# Patient Record
Sex: MALE | Race: WHITE
[De-identification: names, ages, dates, MRNs, and addresses within clinical notes are randomized per-mention and may not be internally consistent; named-entity substitution may affect disease eponyms.]

---

## 2018-01-03 ENCOUNTER — HOSPITAL ENCOUNTER (OUTPATIENT)
Age: 63
Discharge: HOME | End: 2018-01-03

## 2018-01-03 ENCOUNTER — HOSPITAL ENCOUNTER (OUTPATIENT)
Dept: HOSPITAL 91 - SDS | Age: 63
Discharge: HOME | End: 2018-01-03
Payer: COMMERCIAL

## 2018-01-03 DIAGNOSIS — J44.9: Primary | ICD-10-CM

## 2018-01-03 PROCEDURE — 71045 X-RAY EXAM CHEST 1 VIEW: CPT

## 2018-01-03 PROCEDURE — 31622 DX BRONCHOSCOPE/WASH: CPT

## 2018-01-03 PROCEDURE — 93005 ELECTROCARDIOGRAM TRACING: CPT

## 2018-01-03 PROCEDURE — 87102 FUNGUS ISOLATION CULTURE: CPT

## 2018-01-03 PROCEDURE — 87116 MYCOBACTERIA CULTURE: CPT

## 2018-02-21 ENCOUNTER — HOSPITAL ENCOUNTER (OUTPATIENT)
Age: 63
Discharge: HOME | End: 2018-02-21

## 2018-02-21 ENCOUNTER — HOSPITAL ENCOUNTER (OUTPATIENT)
Dept: HOSPITAL 91 - HPC | Age: 63
Discharge: HOME | End: 2018-02-21
Payer: COMMERCIAL

## 2018-02-21 DIAGNOSIS — K76.0: ICD-10-CM

## 2018-02-21 DIAGNOSIS — N31.9: ICD-10-CM

## 2018-02-21 DIAGNOSIS — Z72.0: ICD-10-CM

## 2018-02-21 DIAGNOSIS — B19.20: ICD-10-CM

## 2018-02-21 DIAGNOSIS — K76.89: Primary | ICD-10-CM

## 2018-02-21 DIAGNOSIS — N13.9: ICD-10-CM

## 2018-02-21 DIAGNOSIS — F10.11: ICD-10-CM

## 2018-02-21 DIAGNOSIS — J44.9: ICD-10-CM

## 2019-04-20 ENCOUNTER — HOSPITAL ENCOUNTER (OUTPATIENT)
Dept: HOSPITAL 10 - SDS | Age: 64
Discharge: HOME | End: 2019-04-20
Attending: UROLOGY
Payer: COMMERCIAL

## 2019-04-20 ENCOUNTER — HOSPITAL ENCOUNTER (OUTPATIENT)
Dept: HOSPITAL 91 - SDS | Age: 64
Discharge: HOME | End: 2019-04-20
Payer: COMMERCIAL

## 2019-04-20 VITALS — SYSTOLIC BLOOD PRESSURE: 136 MMHG | HEART RATE: 56 BPM | RESPIRATION RATE: 12 BRPM | DIASTOLIC BLOOD PRESSURE: 79 MMHG

## 2019-04-20 VITALS — HEART RATE: 62 BPM | SYSTOLIC BLOOD PRESSURE: 127 MMHG | RESPIRATION RATE: 18 BRPM | DIASTOLIC BLOOD PRESSURE: 75 MMHG

## 2019-04-20 VITALS — RESPIRATION RATE: 14 BRPM | SYSTOLIC BLOOD PRESSURE: 138 MMHG | DIASTOLIC BLOOD PRESSURE: 82 MMHG | HEART RATE: 56 BPM

## 2019-04-20 VITALS — RESPIRATION RATE: 18 BRPM | HEART RATE: 68 BPM | SYSTOLIC BLOOD PRESSURE: 142 MMHG | DIASTOLIC BLOOD PRESSURE: 82 MMHG

## 2019-04-20 VITALS — HEART RATE: 58 BPM | SYSTOLIC BLOOD PRESSURE: 130 MMHG | DIASTOLIC BLOOD PRESSURE: 75 MMHG | RESPIRATION RATE: 12 BRPM

## 2019-04-20 VITALS — RESPIRATION RATE: 11 BRPM | DIASTOLIC BLOOD PRESSURE: 80 MMHG | SYSTOLIC BLOOD PRESSURE: 142 MMHG | HEART RATE: 158 BPM

## 2019-04-20 VITALS — SYSTOLIC BLOOD PRESSURE: 132 MMHG | DIASTOLIC BLOOD PRESSURE: 79 MMHG | RESPIRATION RATE: 12 BRPM | HEART RATE: 56 BPM

## 2019-04-20 VITALS — RESPIRATION RATE: 12 BRPM | HEART RATE: 56 BPM | DIASTOLIC BLOOD PRESSURE: 79 MMHG | SYSTOLIC BLOOD PRESSURE: 136 MMHG

## 2019-04-20 VITALS — RESPIRATION RATE: 11 BRPM | DIASTOLIC BLOOD PRESSURE: 74 MMHG | HEART RATE: 62 BPM | SYSTOLIC BLOOD PRESSURE: 116 MMHG

## 2019-04-20 VITALS — HEART RATE: 62 BPM | SYSTOLIC BLOOD PRESSURE: 135 MMHG | DIASTOLIC BLOOD PRESSURE: 80 MMHG | RESPIRATION RATE: 21 BRPM

## 2019-04-20 VITALS — DIASTOLIC BLOOD PRESSURE: 83 MMHG | RESPIRATION RATE: 14 BRPM | HEART RATE: 60 BPM | SYSTOLIC BLOOD PRESSURE: 134 MMHG

## 2019-04-20 VITALS — SYSTOLIC BLOOD PRESSURE: 138 MMHG | RESPIRATION RATE: 22 BRPM | DIASTOLIC BLOOD PRESSURE: 82 MMHG | HEART RATE: 64 BPM

## 2019-04-20 VITALS — RESPIRATION RATE: 18 BRPM | DIASTOLIC BLOOD PRESSURE: 73 MMHG | SYSTOLIC BLOOD PRESSURE: 111 MMHG

## 2019-04-20 VITALS — RESPIRATION RATE: 14 BRPM | DIASTOLIC BLOOD PRESSURE: 82 MMHG | SYSTOLIC BLOOD PRESSURE: 138 MMHG | HEART RATE: 56 BPM

## 2019-04-20 VITALS
WEIGHT: 162.26 LBS | WEIGHT: 162.26 LBS | BODY MASS INDEX: 25.47 KG/M2 | HEIGHT: 67 IN | BODY MASS INDEX: 25.47 KG/M2 | HEIGHT: 67 IN | BODY MASS INDEX: 25.47 KG/M2

## 2019-04-20 VITALS — SYSTOLIC BLOOD PRESSURE: 123 MMHG | DIASTOLIC BLOOD PRESSURE: 87 MMHG | RESPIRATION RATE: 22 BRPM

## 2019-04-20 VITALS — DIASTOLIC BLOOD PRESSURE: 72 MMHG | SYSTOLIC BLOOD PRESSURE: 119 MMHG | RESPIRATION RATE: 23 BRPM | HEART RATE: 122 BPM

## 2019-04-20 VITALS — RESPIRATION RATE: 22 BRPM | DIASTOLIC BLOOD PRESSURE: 82 MMHG | HEART RATE: 64 BPM | SYSTOLIC BLOOD PRESSURE: 138 MMHG

## 2019-04-20 VITALS — HEART RATE: 62 BPM | SYSTOLIC BLOOD PRESSURE: 135 MMHG | RESPIRATION RATE: 21 BRPM | DIASTOLIC BLOOD PRESSURE: 80 MMHG

## 2019-04-20 VITALS — DIASTOLIC BLOOD PRESSURE: 77 MMHG | RESPIRATION RATE: 11 BRPM | HEART RATE: 60 BPM | SYSTOLIC BLOOD PRESSURE: 137 MMHG

## 2019-04-20 VITALS — DIASTOLIC BLOOD PRESSURE: 80 MMHG | SYSTOLIC BLOOD PRESSURE: 139 MMHG | RESPIRATION RATE: 18 BRPM | HEART RATE: 68 BPM

## 2019-04-20 VITALS — SYSTOLIC BLOOD PRESSURE: 126 MMHG | RESPIRATION RATE: 14 BRPM | DIASTOLIC BLOOD PRESSURE: 73 MMHG | HEART RATE: 52 BPM

## 2019-04-20 VITALS — HEART RATE: 76 BPM | RESPIRATION RATE: 13 BRPM | SYSTOLIC BLOOD PRESSURE: 129 MMHG | DIASTOLIC BLOOD PRESSURE: 73 MMHG

## 2019-04-20 VITALS — DIASTOLIC BLOOD PRESSURE: 78 MMHG | SYSTOLIC BLOOD PRESSURE: 132 MMHG | RESPIRATION RATE: 13 BRPM | HEART RATE: 52 BPM

## 2019-04-20 DIAGNOSIS — N43.40: ICD-10-CM

## 2019-04-20 DIAGNOSIS — I12.9: ICD-10-CM

## 2019-04-20 DIAGNOSIS — J44.9: ICD-10-CM

## 2019-04-20 DIAGNOSIS — N43.3: Primary | ICD-10-CM

## 2019-04-20 DIAGNOSIS — N18.9: ICD-10-CM

## 2019-04-20 PROCEDURE — 55041 REMOVAL OF HYDROCELES: CPT

## 2019-04-20 PROCEDURE — 88302 TISSUE EXAM BY PATHOLOGIST: CPT

## 2019-04-20 RX ADMIN — CEFTRIAXONE 1 MLS/HR: 1 INJECTION, SOLUTION INTRAVENOUS at 08:00

## 2019-04-20 RX ADMIN — BUPIVACAINE HYDROCHLORIDE 1 ML: 5 INJECTION, SOLUTION EPIDURAL; INTRACAUDAL; PERINEURAL at 08:53

## 2019-04-20 RX ADMIN — HYDROMORPHONE HYDROCHLORIDE 1 MG: 2 INJECTION INTRAMUSCULAR; INTRAVENOUS; SUBCUTANEOUS at 10:03

## 2019-04-20 RX ADMIN — BACITRACIN ZINC, AND POLYMYXIN B SULFATE 1 APPLIC: 500; 10000 OINTMENT TOPICAL at 06:59

## 2019-04-20 RX ADMIN — HYDROCODONE BITARTRATE AND ACETAMINOPHEN 1 TAB: 5; 325 TABLET ORAL at 11:10

## 2019-04-20 RX ADMIN — HYDROMORPHONE HYDROCHLORIDE 1 MG: 2 INJECTION INTRAMUSCULAR; INTRAVENOUS; SUBCUTANEOUS at 09:48

## 2019-04-20 NOTE — PAC
Date/Time of Note


Date/Time of Note


DATE: 4/20/19 


TIME: 09:15





Post-Anesthesia Notes


Post-Anesthesia Note


Last documented vital signs





Vital Signs


  Date      Temp  Pulse  Resp  B/P (MAP)   Pulse Ox  O2          O2 Flow    FiO2


Time                                                 Delivery    Rate


   4/20/19  98.2    122    23      119/72        97  Room Air


     06:42                           (88)





Activity:  WNL


Respiratory function:  WNL


Cardiovascular function:  WNL


Mental status:  Baseline


Pain reasonably controlled:  Yes


Hydration appropriate:  Yes


Nausea/Vomiting absent:  Yes











BIGG HADDAD MD          Apr 20, 2019 09:15

## 2019-04-20 NOTE — OPR
Date/Time of Note


Date/Time of Note


DATE: 4/20/19 


TIME: 09:20





Operative Report


Procedure Date:  Apr 20, 2019


Preoperative Diagnosis


Bilateral hydroceles


Postoperative Diagnosis


Bilateral hydroceles and spermatoceles


Operation/Procedure Performed


Bilateral hydrocelectomy and excision of bilateral spermatoceles.


Surgeon


see signature line


Assistant


Scrub tech Phyllis


Anesthesia Type:  general


Anesthesiologist:  BIGG HADDAD MD


Estimated Blood Loss:  0 - 10 ml's


Transfusion


   none


Specimen


Hydrocele sacs


Grafts/Implants


none


Complications


none


Pt Condition Post Procedure:  stable


Disposition:  PACU


Indications


Bilateral hydroceles


Procedure Description


The patient was brought to the operating room and given general anesthesia.  The


patient was positioned in the supine position and the lower abdomen genital area


and upper thighs were all prepped and draped in the usual sterile manner.  


Patient was given 1 g of ceftriaxone IV at the start of the procedure.  A 


timeout was done and the patient was identified by his name, birthdate and the 


procedure.  A vertical incision was made over the right side of the scrotum and 


deepened through the different layers of the scrotal wall then the scrotal 


content was delivered through the incision and the patient was found to have 


multiple large spermatoceles in addition to the hydrocele.  The hydrocele was dr


ained and then the spermatoceles were all drained and it had about 6 or 7 


spermatoceles.  All the hydrocele and the spermatocele sacs where excised.  They


wear all drained and their edges and bases were fulgurated.  Good hemostasis was


obtained.  A 10 mm LEONIE drain was inserted through a different stab wound.  The 


wound was then closed using 3-0 Vicryl running interlocked sutures for the 


subcutaneous tissue and 3-0 Vicryl mattress sutures for the skin.  The LEONIE drain 


was secured with a suture of 3-0 Vicryl as well.  Similar procedure was done on 


the left side except no LEONIE drain was inserted at that side.  And the 


spermatoceles and the hydrocele on the left side were smaller.  The patient was 


given half percent Marcaine injection on both incisions for local analgesia.  A 


sterile dressing was applied on both incisions first Telfa and then fluffs these


were held in place with a scrotal support.  Patient was discharged to the 


recovery room in a stable and satisfactory condition.  He will come to the 


office in about 4-5 days to have the LEONIE drain removed.











SOLOMON DOYLE MD            Apr 20, 2019 09:31

## 2019-04-20 NOTE — PREAC
Date/Time of Note


Date/Time of Note


DATE: 4/20/19 


TIME: 07:19





Anesthesia Eval and Record


Evaluation


Time Pre-Procedure Interview


DATE: 4/20/19 


TIME: 07:19


Age


63


Sex


male


NPO:  8 hrs


Preoperative diagnosis


Bilateral hydrocele


Planned procedure


Bilateral hydrocelectomy





Past Medical History


Past Medical History:  Includes


Cardio:  HTN


Pulm:  Smoking Hx, COPD


Renal:  CKD


Infection(s):  Hep C, Other (S/p Sovaldi, undetectable level)





Surgery & Anesthesia Issues


No known issue





Meds


Anticoagulation:  No


Beta Blocker within 24 hr:  No


Reason Beta Blocker not given:  Pt. not on B-Blocker


Reported Medications


[onoro]   No Conflict Check, 2 PUFFS DAILY


   4/18/19


Atorvastatin Calcium* (Atorvastatin Calcium*) 20 Mg Tablet, 20 MG PO QHS, #30 


TAB


   4/18/19


Fluticasone/Umeclidin/Vilanter (Trelegy Ellipta 100-62.5-25) 1 Each Blst.w.dev, 


1 EACH IH Q6 for SHORTNESS OF BREATH


   2/21/18


Meds reviewed:  Yes





Allergies


Coded Allergies:  


     No Known Allergy (Unverified , 1/3/18)


Allergies Reviewed:  Yes





Labs/Studies


Labs Reviewed:  Reviewed by anesthesiologist


Pregnancy test:  N/A


Studies:  ECG, CXR





Pre-procedure Exam


Last vitals





Vital Signs


  Date      Temp  Pulse  Resp  B/P (MAP)   Pulse Ox  O2          O2 Flow    FiO2


Time                                                 Delivery    Rate


   4/20/19  98.2    122    23      119/72        97  Room Air


     06:42                           (88)





Airway:  Adequate mouth opening, Adequate thyromental dist


Mallampati:  Mallampati I


Teeth:  Normal


Lung:  Normal


Heart:  Normal





ASA Physical Status


ASA physical status:  3


Emergency:  None





Planned Anesthetic


General/MAC:  ETT





Pre-operative Attestations


Prior to commencing anesthesia and surgery, the patient was re-evaluated, there 


was verification of:


*The patient's identity


*The results of appropriate recent lab work and preoperative vital signs


*The above evaluation not changing prior to induction


*Anesthetic plan, risk benefits, alternative and complications discussed with 


patient/family; questions answered; patient/family understands, accepts and 


wishes to proceed.











BIGG HADDAD MD          Apr 20, 2019 07:22

## 2019-04-20 NOTE — HPN
Date/Time of Note


Date/Time of Note


DATE: 4/20/19 


TIME: 09:15





Interval H&P Admission Note


Pt. seen H&P reviewed:  No system changes











SOLOMON DOYLE MD            Apr 20, 2019 09:15